# Patient Record
Sex: MALE | Race: WHITE | NOT HISPANIC OR LATINO | Employment: FULL TIME | URBAN - METROPOLITAN AREA
[De-identification: names, ages, dates, MRNs, and addresses within clinical notes are randomized per-mention and may not be internally consistent; named-entity substitution may affect disease eponyms.]

---

## 2024-11-07 ENCOUNTER — HOSPITAL ENCOUNTER (EMERGENCY)
Facility: HOSPITAL | Age: 24
Discharge: HOME | End: 2024-11-07
Attending: STUDENT IN AN ORGANIZED HEALTH CARE EDUCATION/TRAINING PROGRAM

## 2024-11-07 VITALS
HEIGHT: 73 IN | WEIGHT: 185 LBS | RESPIRATION RATE: 16 BRPM | DIASTOLIC BLOOD PRESSURE: 82 MMHG | TEMPERATURE: 98.8 F | HEART RATE: 83 BPM | SYSTOLIC BLOOD PRESSURE: 136 MMHG | OXYGEN SATURATION: 98 % | BODY MASS INDEX: 24.52 KG/M2

## 2024-11-07 DIAGNOSIS — S51.812A FOREARM LACERATION, LEFT, INITIAL ENCOUNTER: Primary | ICD-10-CM

## 2024-11-07 PROCEDURE — 90471 IMMUNIZATION ADMIN: CPT

## 2024-11-07 PROCEDURE — 2500000004 HC RX 250 GENERAL PHARMACY W/ HCPCS (ALT 636 FOR OP/ED)

## 2024-11-07 PROCEDURE — 90715 TDAP VACCINE 7 YRS/> IM: CPT

## 2024-11-07 PROCEDURE — 99283 EMERGENCY DEPT VISIT LOW MDM: CPT | Mod: 25

## 2024-11-07 RX ORDER — CEPHALEXIN 500 MG/1
500 CAPSULE ORAL 2 TIMES DAILY
Qty: 10 CAPSULE | Refills: 0 | Status: SHIPPED | OUTPATIENT
Start: 2024-11-07 | End: 2024-11-07

## 2024-11-07 RX ORDER — CEPHALEXIN 500 MG/1
500 CAPSULE ORAL 2 TIMES DAILY
Qty: 10 CAPSULE | Refills: 0 | Status: SHIPPED | OUTPATIENT
Start: 2024-11-07 | End: 2024-11-12

## 2024-11-07 ASSESSMENT — COLUMBIA-SUICIDE SEVERITY RATING SCALE - C-SSRS
6. HAVE YOU EVER DONE ANYTHING, STARTED TO DO ANYTHING, OR PREPARED TO DO ANYTHING TO END YOUR LIFE?: NO
1. IN THE PAST MONTH, HAVE YOU WISHED YOU WERE DEAD OR WISHED YOU COULD GO TO SLEEP AND NOT WAKE UP?: NO
2. HAVE YOU ACTUALLY HAD ANY THOUGHTS OF KILLING YOURSELF?: NO

## 2024-11-07 ASSESSMENT — PAIN - FUNCTIONAL ASSESSMENT: PAIN_FUNCTIONAL_ASSESSMENT: 0-10

## 2024-11-07 ASSESSMENT — PAIN DESCRIPTION - PAIN TYPE: TYPE: ACUTE PAIN

## 2024-11-07 ASSESSMENT — PAIN DESCRIPTION - ORIENTATION: ORIENTATION: LEFT

## 2024-11-07 ASSESSMENT — PAIN DESCRIPTION - LOCATION: LOCATION: ARM

## 2024-11-07 ASSESSMENT — PAIN DESCRIPTION - DESCRIPTORS: DESCRIPTORS: SORE

## 2024-11-07 ASSESSMENT — PAIN SCALES - GENERAL: PAINLEVEL_OUTOF10: 2

## 2024-11-07 NOTE — DISCHARGE INSTRUCTIONS
You are seen in emerged department for a laceration of your left forearm.  You determined to have no life-threatening complication of your injury.    Please utilize ibuprofen and Tylenol as needed.    Seek immediate medical attention if your wound develops: redness, swelling, warmth to touch, discharge or drainage, increasing pain, or any new or worsening symptoms.    Seek immediate medical attention if you develop:  fever 38 C (100.4 F), chills, nausea, vomiting, or any new or worsening symptoms.

## 2024-11-07 NOTE — ED PROVIDER NOTES
Emergency Department Provider Note        History of Present Illness     History provided by: Patient  Limitations to History: None  External Records Reviewed with Brief Summary: None    HPI:  Kurt Zhao is a 24 y.o. male, healthy presenting to the ED with complaint of a laceration to his left forearm.  Patient reports he was wining some magda with a  that was in his blade and cut his left forearm.  Patient does not know when his last tetanus shot was.  Denies any sick symptoms and feels well otherwise.    Physical Exam   Triage vitals:  T 37.1 °C (98.8 °F)  HR 83  /82  RR 16  O2 98 % None (Room air)    General: Awake, alert, in no acute distress  Eyes: Gaze conjugate.  No scleral icterus or injection  HENT: Normo-cephalic, atraumatic. No stridor  CV: Regular rate, regular rhythm. Radial pulses 2+ bilaterally  Resp: Breathing non-labored, speaking in full sentences.  Clear to auscultation bilaterally  GI: Soft, non-distended, non-tender. No rebound or guarding.  MSK/Extremities: No gross bony deformities. Moving all extremities  Skin: Warm. Appropriate color. 0.8 cm laceration, non-gaping of left mid-forearm on the volar aspect. Not actively bleeding.  Neuro: Alert. Oriented. Face symmetric. Speech is fluent.  Gross strength and sensation intact in b/l UE and Les  Left Hand Exam: Patient exhibits ability to flex and extend all digits. Including flexion at the proximal and distal interphalangeal joints against resistance.  Median nerve was tested with thumb abduction against resistance and opposition which were normal.  Sensory testing was performed of the median nerve using light touch on the radial and ulnar aspects of digits 1 through 3.  Radial nerve testing was performed with thumb extension against resistance which was normal.  Sensory testing of the radial nerve was normal via light touch to the central and radial aspect of the dorsum of the hand and dorsal radial aspect of the  thumb.  Ulnar nerve was tested via abduction of fingers against resistance as well as light touch to the fourth and fifth digits.  Cap refill was less than 2 seconds in all 5 digits.  Alignment check was performed which revealed no malrotation.  No sign of acute compartment syndrome, flexor tenosynovitis, high-pressure injection injury.  No flexor tendon injury.  Psych: Appropriate mood and affect    Medical Decision Making & ED Course   Medical Decision Makin y.o. male, healthy, being evaluated in the ED for a laceration to his left forearm.  Patient is afebrile, sinus, normotensive, saturating well on room air, and in no acute distress.  On exam patient's laceration does not appear to require sutures and is well-approximated.  Due to the puncture injury, we will prescribe a short course of Keflex.    ----  Social Determinants of Health which Significantly Impact Care: None identified     EKG Independent Interpretation: EKG not obtained    Independent Result Review and Interpretation: None obtained    Chronic conditions affecting the patient's care: None    The patient was discussed with the following consultants/services: None    Care Considerations:  Considered laceration suture repair however the wound is well-approximated and not actively bleeding as well as small in size    ED Course:  Diagnoses as of 24 2327   Forearm laceration, left, initial encounter     Disposition   As a result of the work-up, the patient was discharged home.  he was informed of his diagnosis and instructed to come back with any concerns or worsening of condition.  he and was agreeable to the plan as discussed above.  he was given the opportunity to ask questions.  All of the patient's questions were answered.    Procedures   Procedures    This was a shared visit with an ED attending.  The patient was seen and discussed with the ED attending    Tanner Salazar MD  Resident  24 [id number]    I did evaluate the patient  independently from the resident and was present for key medical decision making.  Agree with disposition.  Any discrepancies between residents findings are detailed below.    This is a 24-year-old male presenting to the emergency department for puncture wound to the left forearm.  Patient states that he was cutting magda and the  subsequently slipped striking him in the left forearm.  He is uncertain of his last tetanus vaccination.  He is not immunocompromise.  Denies any numbness tingling or weakness at this time.  This is his nondominant hand.    VS: As documented in the triage note from today's date and EMR flowsheet were reviewed.  Gen: Well developed. No acute distress. Seated in bed. Appears nontoxic.   Skin: Warm. Dry.  Small 0.8 cm puncture wound no active bleeding not gaping. No rashes or lesions.  Eyes: Pupils equally round and reactive to light. Clear sclera.   HENT: Atraumatic appearance. Mucosal membranes moist. No oral lesions, uvula midline, airway patent.   CV: Regular rate and regular rhythm. S1, S2. No pedal edema. Warm extremities.  Resp: Nonlabored breathing Clear to auscultation bilaterally. No increased work of breathing.   GI: Soft and nontender. No rebound or guarding. Bowel sounds x4 present.   MSK: Symmetric muscle bulk. No joint swelling in the extremities. Compartments are soft. Neurovascularly intact x4 extremities. Radial pulses +2 equal bilaterally.  Full strength in flexion extension abduction adduction and opposition throughout the affected extremity.  Neuro: Alert.  Speech fluent. Moving all extremities. No focal deficits. Gait normal.  Psych: Appropriate. Kempt.    Patient arrives hypertensive recommend follow-up with primary physician for repeat checks.  Patient's tetanus vaccination was updated.  There is no indication for wound closure at this time as it is well-approximated.  Patient was offered pain medications although declined.  See no indication for imaging no  evidence of foreign body retained.  There is no evidence of neurovascular injury or tendinous injury either patient has full strength and function distally.  He is recommended strict return precautions provided home-going antibiotics and wound reevaluation by primary physician outpatient.      DO Scooby Collier,   11/07/24 [id number]

## 2024-11-07 NOTE — ED TRIAGE NOTES
PT. STATES WAS CUTTING SOMETHING WITH RAZOR, SLIPPED AND CUT LEFT ARM. SMALL LAC NOTED TO LEFT FOREARM. LAST TETANUS UNKNOWN. DENIES NUMBNESS/TINGLING.